# Patient Record
Sex: FEMALE | Race: WHITE | Employment: UNEMPLOYED | URBAN - METROPOLITAN AREA
[De-identification: names, ages, dates, MRNs, and addresses within clinical notes are randomized per-mention and may not be internally consistent; named-entity substitution may affect disease eponyms.]

---

## 2022-04-07 ENCOUNTER — OFFICE VISIT (OUTPATIENT)
Dept: OBGYN | Age: 27
End: 2022-04-07
Payer: COMMERCIAL

## 2022-04-07 VITALS
HEART RATE: 70 BPM | SYSTOLIC BLOOD PRESSURE: 116 MMHG | DIASTOLIC BLOOD PRESSURE: 61 MMHG | BODY MASS INDEX: 24.63 KG/M2 | WEIGHT: 162 LBS

## 2022-04-07 DIAGNOSIS — N93.9 ABNORMAL UTERINE BLEEDING (AUB): ICD-10-CM

## 2022-04-07 DIAGNOSIS — R10.2 PELVIC PAIN: Primary | ICD-10-CM

## 2022-04-07 LAB
HCT VFR BLD CALC: 42.3 % (ref 34–48)
HEMOGLOBIN: 13.5 G/DL (ref 11.5–15.5)
MCH RBC QN AUTO: 30.3 PG (ref 26–35)
MCHC RBC AUTO-ENTMCNC: 31.9 % (ref 32–34.5)
MCV RBC AUTO: 95.1 FL (ref 80–99.9)
PDW BLD-RTO: 13.5 FL (ref 11.5–15)
PLATELET # BLD: 340 E9/L (ref 130–450)
PMV BLD AUTO: 10.2 FL (ref 7–12)
PROLACTIN: 17.22 NG/ML
RBC # BLD: 4.45 E12/L (ref 3.5–5.5)
WBC # BLD: 9.5 E9/L (ref 4.5–11.5)

## 2022-04-07 PROCEDURE — 36415 COLL VENOUS BLD VENIPUNCTURE: CPT | Performed by: OBSTETRICS & GYNECOLOGY

## 2022-04-07 PROCEDURE — G8427 DOCREV CUR MEDS BY ELIG CLIN: HCPCS | Performed by: OBSTETRICS & GYNECOLOGY

## 2022-04-07 PROCEDURE — 99204 OFFICE O/P NEW MOD 45 MIN: CPT | Performed by: OBSTETRICS & GYNECOLOGY

## 2022-04-07 PROCEDURE — 4004F PT TOBACCO SCREEN RCVD TLK: CPT | Performed by: OBSTETRICS & GYNECOLOGY

## 2022-04-07 PROCEDURE — G8420 CALC BMI NORM PARAMETERS: HCPCS | Performed by: OBSTETRICS & GYNECOLOGY

## 2022-04-07 PROCEDURE — 99203 OFFICE O/P NEW LOW 30 MIN: CPT | Performed by: OBSTETRICS & GYNECOLOGY

## 2022-04-07 RX ORDER — ACETAMINOPHEN AND CODEINE PHOSPHATE 120; 12 MG/5ML; MG/5ML
1 SOLUTION ORAL DAILY
Qty: 30 TABLET | Refills: 1 | Status: SHIPPED
Start: 2022-04-07 | End: 2022-06-08 | Stop reason: SDUPTHER

## 2022-04-07 RX ORDER — ACETAMINOPHEN AND CODEINE PHOSPHATE 120; 12 MG/5ML; MG/5ML
SOLUTION ORAL
COMMUNITY
Start: 2022-01-12 | End: 2022-04-07 | Stop reason: SDUPTHER

## 2022-04-07 RX ORDER — TOPIRAMATE 25 MG/1
TABLET ORAL
COMMUNITY
Start: 2022-03-25 | End: 2022-07-18 | Stop reason: ALTCHOICE

## 2022-04-07 NOTE — PROGRESS NOTES
Dr. Ginny Carias:    33 y.o. female  Smith Guo presents with complaint of chronic pelvic pain since age 13. She was placed on OCP (Ovcon) that helped for some time. In September her pain worsened and she began to have 2 painful periods a month. She also had pain outside of her cycles. She is not sexually active and has never been. She trialed pelvic floor therapy without relief. Her doctor tried to place an IUD however was unsuccessful due to cervical stenosis. In February she was switched to norethindrone, now she is bleeding 3 times a month. Previous work up includes ultrasound and blood work which patient states is normal. She did have a small simple cyst on left ovary. The pain is described as bilateral, non radiating, and constant. Pt denies relief with tylenol and ibuprofen. She admits occasional nausea. She denies vomiting, diarrhea, or constipation. No fevers/chills. Denies dysuria or hematuria. Denies vaginal discharge. She does admit to some painful Bowel movements. Past Medical History:   Past Medical History:   Diagnosis Date    Anorexia     Anxiety     Depression     Migraine     with aura                                             OB History    Para Term  AB Living   0 0 0 0 0 0   SAB IAB Ectopic Molar Multiple Live Births   0 0 0 0 0 0         Past Surgical History: No past surgical history on file. Allergies: Patient has no known allergies.      Medications:   Current Outpatient Medications   Medication Sig Dispense Refill    sertraline (ZOLOFT) 50 MG tablet take ONE AND ONE-HALF tablets by mouth daily      topiramate (TOPAMAX) 25 MG tablet       norethindrone (MICRONOR) 0.35 MG tablet Take 1 tablet by mouth daily 30 tablet 1    ondansetron (ZOFRAN) 4 MG tablet Take 1 tablet by mouth every 8 hours as needed for Nausea or Vomiting (Patient not taking: Reported on 2022) 12 tablet 0    dicyclomine (BENTYL) 10 MG capsule 1 cupule every 6-8 hours for abdominal cramps when necessary (Patient not taking: Reported on 4/7/2022) 15 capsule 0     No current facility-administered medications for this visit. Social History:   Social History     Tobacco Use    Smoking status: Never Smoker    Smokeless tobacco: Never Used   Substance Use Topics    Alcohol use: No        Family History:   No family history on file. REVIEW OF SYSTEMS:    Constitutional: negative  HEENT: negative  Breast: negative  Respiratory: negative  Cardiovascular: negative  Gastrointestinal: negative  Genitourinary: As per HPI  Integument: negative  Neurological: negative  Endocrine: negative          PHYSICAL EXAM  /61   Pulse 70   Wt 162 lb (73.5 kg)   LMP 04/04/2022   BMI 24.63 kg/m²   Patient's last menstrual period was 04/04/2022. General appearance: alert, cooperative and in no acute distress. Head: NCAT   Abdomen: soft, non-tender; bowel sounds normal; no masses,  no organomegaly  Psych: No acute distress, mood and affect full range  Neuro: Alert and oriented, no focal deficits     Pelvic Exam:   EXTERNAL GENITALIA: normal external genitalia, no external lesions  VAGINA: normal rugae, no discharge   CERVIX: normal appearing, no lesions, small amount of blood  UTERUS: uterus is normal size, shape, consistency and nontender   ADNEXA: normal adnexa in size, nontender and no masses. RECTUM: no hemorrhoids or rectal masses. ASSESSMENT :      Diagnosis Orders   1. Pelvic pain  US NON OB TRANSVAGINAL    norethindrone (MICRONOR) 0.35 MG tablet   2. Abnormal uterine bleeding (AUB)  CBC    Prolactin    US NON OB TRANSVAGINAL    norethindrone (MICRONOR) 0.35 MG tablet        PLAN:    Return in about 2 weeks (around 4/21/2022) for Review results. Discussed depo vs lupron vs orilissa for mgmt. Pt has concern of bone density loss with those medications. Could consider add back therapy with progesterone. Also will consider diagnostic laparoscopy.  Get records from previous gyn. F/u 2-4 weeks for results review and plan. Continue micronor in the mean time.    Orders Placed This Encounter   Medications    norethindrone (MICRONOR) 0.35 MG tablet     Sig: Take 1 tablet by mouth daily     Dispense:  30 tablet     Refill:  1       Orders Placed This Encounter   Procedures    US NON OB TRANSVAGINAL     Standing Status:   Future     Standing Expiration Date:   4/7/2023     Order Specific Question:   Reason for exam:     Answer:   YUJ, pelvic pain    CBC     Standing Status:   Future     Standing Expiration Date:   4/7/2023    Prolactin     Standing Status:   Future     Standing Expiration Date:   4/7/2023         Electronically signed by Rafy Branch DO on 4/7/22

## 2022-04-07 NOTE — PROGRESS NOTES
Patient is here today for new patient visit, patient complains of chronic pelvic pain. Patient signed a records of release to get records from Dr. Natalie Llanos. Blood work was ordered and obtained.

## 2022-04-29 ENCOUNTER — HOSPITAL ENCOUNTER (OUTPATIENT)
Dept: ULTRASOUND IMAGING | Age: 27
Discharge: HOME | End: 2022-04-29
Payer: COMMERCIAL

## 2022-04-29 DIAGNOSIS — R10.2 PELVIC PAIN: ICD-10-CM

## 2022-04-29 DIAGNOSIS — N93.9 ABNORMAL UTERINE BLEEDING (AUB): ICD-10-CM

## 2022-04-29 PROCEDURE — 76830 TRANSVAGINAL US NON-OB: CPT

## 2022-05-05 ENCOUNTER — OFFICE VISIT (OUTPATIENT)
Dept: OBGYN | Age: 27
End: 2022-05-05
Payer: COMMERCIAL

## 2022-05-05 VITALS — SYSTOLIC BLOOD PRESSURE: 111 MMHG | DIASTOLIC BLOOD PRESSURE: 70 MMHG | HEART RATE: 73 BPM

## 2022-05-05 DIAGNOSIS — R10.2 PELVIC PAIN: Primary | ICD-10-CM

## 2022-05-05 PROCEDURE — 1036F TOBACCO NON-USER: CPT | Performed by: OBSTETRICS & GYNECOLOGY

## 2022-05-05 PROCEDURE — G8427 DOCREV CUR MEDS BY ELIG CLIN: HCPCS | Performed by: OBSTETRICS & GYNECOLOGY

## 2022-05-05 PROCEDURE — 99213 OFFICE O/P EST LOW 20 MIN: CPT | Performed by: OBSTETRICS & GYNECOLOGY

## 2022-05-05 PROCEDURE — 99212 OFFICE O/P EST SF 10 MIN: CPT | Performed by: OBSTETRICS & GYNECOLOGY

## 2022-05-05 PROCEDURE — G8420 CALC BMI NORM PARAMETERS: HCPCS | Performed by: OBSTETRICS & GYNECOLOGY

## 2022-05-05 NOTE — PROGRESS NOTES
HISTORY OF PRESENT ILLNESS:    Pt presents for follow up for chronic pelvic pain since age 13. She was placed on OCP (Ovcon) that helped for some time. In September her pain worsened and she began to have 2 painful periods a month. She also had pain outside of her cycles. She is not sexually active and has never been. She trialed pelvic floor therapy without relief. Her doctor tried to place an IUD however was unsuccessful due to cervical stenosis. In February she was switched to norethindrone, now she is bleeding 3 times a month. Previous work up includes ultrasound and blood work which patient states is normal. She did have a small simple cyst on left ovary.      The pain is described as bilateral, non radiating, and constant. Pt denies relief with tylenol and ibuprofen. She admits occasional nausea. She denies vomiting, diarrhea, or constipation. No fevers/chills. Denies dysuria or hematuria. Denies vaginal discharge. She does admit to some painful Bowel movements.      Records from previous gyn not available for review. US wnl. CBC and prolactin wnl. TSH per pt normal. Dec 2021 normal pap. Past Medical History:   Past Medical History:   Diagnosis Date    Anorexia     Anxiety     Depression     Migraine     with aura                                             OB History    Para Term  AB Living   0 0 0 0 0 0   SAB IAB Ectopic Molar Multiple Live Births   0 0 0 0 0 0         Past Surgical History: No past surgical history on file. Allergies: Patient has no known allergies.      Medications:   Current Outpatient Medications   Medication Sig Dispense Refill    sertraline (ZOLOFT) 50 MG tablet take ONE AND ONE-HALF tablets by mouth daily      topiramate (TOPAMAX) 25 MG tablet       norethindrone (MICRONOR) 0.35 MG tablet Take 1 tablet by mouth daily 30 tablet 1    ondansetron (ZOFRAN) 4 MG tablet Take 1 tablet by mouth every 8 hours as needed for Nausea or Vomiting (Patient not taking: Reported on 4/7/2022) 12 tablet 0    dicyclomine (BENTYL) 10 MG capsule 1 cupule every 6-8 hours for abdominal cramps when necessary (Patient not taking: Reported on 4/7/2022) 15 capsule 0     No current facility-administered medications for this visit. Social History:   Social History     Tobacco Use    Smoking status: Never Smoker    Smokeless tobacco: Never Used   Substance Use Topics    Alcohol use: No        Family History:   No family history on file. REVIEW OF SYSTEMS:    Constitutional: negative  HEENT: negative  Breast: negative  Respiratory: negative  Cardiovascular: negative  Gastrointestinal: negative  Genitourinary:negative  Integument: negative  Neurological: negative  Endocrine: negative          PHYSICAL EXAM  /70 (Site: Left Upper Arm, Position: Sitting)   Pulse 73   LMP 04/23/2022 (Exact Date)   Patient's last menstrual period was 04/23/2022 (exact date). General appearance: alert, cooperative and in no acute distress. Head: NCAT   Psych: No acute distress, mood and affect full range  Neuro: Alert and oriented, no focal deficits          ASSESSMENT :   Diagnosis Orders   1. Pelvic pain          PLAN:  Discussed options of orilissa vs depo vs lupron vs surgical. Pt opts for diagnostic lap. Return for preop. No orders of the defined types were placed in this encounter. No orders of the defined types were placed in this encounter.         Electronically signed by Lorena Soria DO on 5/5/22

## 2022-05-05 NOTE — PROGRESS NOTES
Patient alert and pleasant with no complaints  Here today for ultrasound results. Discharge instructions have been discussed with the patient. Patient advised to call our office with any questions or concerns. Voiced understanding.

## 2022-05-16 ENCOUNTER — TELEPHONE (OUTPATIENT)
Dept: OBGYN | Age: 27
End: 2022-05-16

## 2022-05-16 NOTE — TELEPHONE ENCOUNTER
Surgery scheduled 6/1/22 @ 1:00 pm  [address]  Pre-op appointment 5/25/22  Patient made aware of surgery date and time.   Patient voiced understanding   Diagnostic laparoscopy with possible fulguration of endometriosis with possible lysis of adhesions

## 2022-05-17 NOTE — TELEPHONE ENCOUNTER
Dr. Saurabh Morrison will be [facility] on June 1st. Spoke with patient and surgery scheduling, surgery moved to same time on June 15th.

## 2022-06-08 ENCOUNTER — OFFICE VISIT (OUTPATIENT)
Dept: OBGYN | Age: 27
End: 2022-06-08
Payer: COMMERCIAL

## 2022-06-08 VITALS
SYSTOLIC BLOOD PRESSURE: 112 MMHG | WEIGHT: 170.6 LBS | DIASTOLIC BLOOD PRESSURE: 59 MMHG | HEART RATE: 63 BPM | BODY MASS INDEX: 25.94 KG/M2

## 2022-06-08 DIAGNOSIS — N93.9 ABNORMAL UTERINE BLEEDING (AUB): ICD-10-CM

## 2022-06-08 DIAGNOSIS — R10.2 PELVIC PAIN: ICD-10-CM

## 2022-06-08 DIAGNOSIS — N94.6 DYSMENORRHEA: ICD-10-CM

## 2022-06-08 DIAGNOSIS — R10.2 CHRONIC PELVIC PAIN IN FEMALE: Primary | ICD-10-CM

## 2022-06-08 DIAGNOSIS — G89.29 CHRONIC PELVIC PAIN IN FEMALE: Primary | ICD-10-CM

## 2022-06-08 PROCEDURE — G8427 DOCREV CUR MEDS BY ELIG CLIN: HCPCS | Performed by: OBSTETRICS & GYNECOLOGY

## 2022-06-08 PROCEDURE — 99213 OFFICE O/P EST LOW 20 MIN: CPT | Performed by: OBSTETRICS & GYNECOLOGY

## 2022-06-08 PROCEDURE — 99214 OFFICE O/P EST MOD 30 MIN: CPT | Performed by: OBSTETRICS & GYNECOLOGY

## 2022-06-08 PROCEDURE — 1036F TOBACCO NON-USER: CPT | Performed by: OBSTETRICS & GYNECOLOGY

## 2022-06-08 PROCEDURE — G8419 CALC BMI OUT NRM PARAM NOF/U: HCPCS | Performed by: OBSTETRICS & GYNECOLOGY

## 2022-06-08 RX ORDER — CETIRIZINE HYDROCHLORIDE 10 MG/1
10 TABLET ORAL EVERY MORNING
COMMUNITY

## 2022-06-08 RX ORDER — ACETAMINOPHEN AND CODEINE PHOSPHATE 120; 12 MG/5ML; MG/5ML
1 SOLUTION ORAL DAILY
Qty: 30 TABLET | Refills: 3 | Status: ON HOLD
Start: 2022-06-08 | End: 2022-07-20 | Stop reason: HOSPADM

## 2022-06-08 NOTE — PROGRESS NOTES
Occupational History    Not on file   Tobacco Use    Smoking status: Never Smoker    Smokeless tobacco: Never Used   Substance and Sexual Activity    Alcohol use: No    Drug use: No    Sexual activity: Not on file   Other Topics Concern    Not on file   Social History Narrative    Not on file     Social Determinants of Health     Financial Resource Strain:     Difficulty of Paying Living Expenses: Not on file   Food Insecurity:     Worried About Running Out of Food in the Last Year: Not on file    Abhishek of Food in the Last Year: Not on file   Transportation Needs:     Lack of Transportation (Medical): Not on file    Lack of Transportation (Non-Medical): Not on file   Physical Activity:     Days of Exercise per Week: Not on file    Minutes of Exercise per Session: Not on file   Stress:     Feeling of Stress : Not on file   Social Connections:     Frequency of Communication with Friends and Family: Not on file    Frequency of Social Gatherings with Friends and Family: Not on file    Attends [employer] Services: Not on file    Active Member of [address] [place] [address] or Organizations: Not on file    Attends Club or Organization Meetings: Not on file    Marital Status: Not on file   Intimate Partner Violence:     Fear of Current or Ex-Partner: Not on file    Emotionally Abused: Not on file    Physically Abused: Not on file    Sexually Abused: Not on file   Housing Stability:     Unable to Pay for Housing in the Last Year: Not on file    Number of [place] in the Last Year: Not on file    Unstable Housing in the Last Year: Not on file       Family History:   No family history on file. REVIEW OF SYSTEMS:      Constitutional:  negative  Gastrointestinal:  negative  Genitourinary:  positive for dysmenorrhea  Integument/breast:  negative  Hematologic/lymphatic:  negative  Endocrine:  negative  Behavior/Psych:  negative    PHYSICAL EXAM:    Vitals: There were no vitals taken for this visit.     Gen A/Ox3  Heart RRR  Lungs Clear  Abd Soft, NT, ND, +BS  Ext No edema   Will examine in OR        ASSESSMENT AND PLAN:    34 yo with chronic pelvic pain and dysmenorrhea requesting surgical intervention. To OR for diagnostic laparoscopy, possible lysis of adhesions, possible fulguration of endometriosis, placement of IUD. R/B/A explained and informed consent obtained.         Rafy Branch DO 6/8/2022 8:58 AM

## 2022-06-08 NOTE — PROGRESS NOTES
Here today for pre op appointment with Dr. Dwaine Meade. Pre op  instructions have been discussed with the patient. Patient advised to call our office with any questions or concerns. Voiced understanding. Patient was admitted with worsening low back pain and radiculopathy

## 2022-07-18 NOTE — PROGRESS NOTES
[facility] PRE-ADMISSION TESTING INSTRUCTIONS    The Preadmission Testing patient is instructed accordingly using the following criteria (check applicable):    ARRIVAL INSTRUCTIONS:  [x] Parking the day of Surgery is located in the Main Entrance lot. Upon entering the door, make an immediate right to the surgery reception desk    [x] Bring photo ID and insurance card    [x] Bring in a copy of Living will or Durable Power of  papers. [x] Please be sure to arrange for responsible adult to provide transportation to and from the hospital    [x] Please arrange for responsible adult to be with you for the 24 hour period post procedure due to having anesthesia    [] If you awake am of surgery not feeling well or have temperature >100 please call [phone]    GENERAL INSTRUCTIONS:    [x] Nothing by mouth after midnight, including gum, candy, mints or water    [x] You may brush your teeth, but do not swallow any water    [] Take medications as instructed with 1-2 oz of water    [] Stop herbal supplements and vitamins 5 days prior to procedure    [] Follow preop dosing of blood thinners per physician instructions    [] Take 1/2 dose of evening insulin, but no insulin after midnight    [] No oral diabetic medications after midnight    [] If diabetic and have low blood sugar or feel symptomatic, take 1-2oz apple juice only    [x] Bring inhalers day of surgery    [] Bring C-PAP/ Bi-Pap day of surgery    [x] Bring urine specimen day of surgery    [x] Shower or bath with soap, lather and rinse well, AM of Surgery, no lotion, powders or creams to surgical site    [] Follow bowel prep as instructed per surgeon    [x] No tobacco products within 24 hours of surgery     [x] No alcohol or illegal drug use within 24 hours of surgery.     [x] Jewelry, body piercing's, eyeglasses, contact lenses and dentures are not permitted into surgery (bring cases)      [x] Please do not wear any nail polish, make up or hair products on the day of surgery    [x] You can expect a call the business day prior to procedure to notify you if your arrival time changes    [x] If you receive a survey after surgery we would greatly appreciate your comments    [] Parent/guardian of a minor must accompany their child and remain on the premises  the entire time they are under our care     [] Pediatric patients may bring favorite toy, blanket or comfort item with them    [] A caregiver or family member must remain with the patient during their stay if they are mentally handicapped, have dementia, disoriented or unable to use a call light or would be a safety concern if left unattended    [x] Please notify surgeon if you develop any illness between now and time of surgery (cold, cough, sore throat, fever, nausea, vomiting) or any signs of infections  including skin, wounds, and dental.    [x]  The Outpatient Pharmacy is available to fill your prescription here on your day of surgery, ask your preop nurse for details    [] Other instructions    EDUCATIONAL MATERIALS PROVIDED:    [] PAT Preoperative Education Packet/Booklet     [] Medication List    [] Transfusion bracelet applied with instructions    [] Shower with soap, lather and rinse well, and use CHG wipes provided the evening before surgery as instructed    [] Incentive spirometer with instructions

## 2022-07-20 ENCOUNTER — ANESTHESIA EVENT (OUTPATIENT)
Dept: OPERATING ROOM | Age: 27
End: 2022-07-20
Payer: COMMERCIAL

## 2022-07-20 ENCOUNTER — ANESTHESIA (OUTPATIENT)
Dept: OPERATING ROOM | Age: 27
End: 2022-07-20
Payer: COMMERCIAL

## 2022-07-20 ENCOUNTER — HOSPITAL ENCOUNTER (OUTPATIENT)
Age: 27
Setting detail: OUTPATIENT SURGERY
Discharge: HOME | End: 2022-07-20
Attending: OBSTETRICS & GYNECOLOGY | Admitting: OBSTETRICS & GYNECOLOGY
Payer: COMMERCIAL

## 2022-07-20 VITALS
HEART RATE: 86 BPM | RESPIRATION RATE: 16 BRPM | OXYGEN SATURATION: 97 % | BODY MASS INDEX: 25.01 KG/M2 | HEIGHT: 68 IN | TEMPERATURE: 97.2 F | DIASTOLIC BLOOD PRESSURE: 60 MMHG | SYSTOLIC BLOOD PRESSURE: 116 MMHG | WEIGHT: 165 LBS

## 2022-07-20 DIAGNOSIS — G89.18 POSTOPERATIVE PAIN: Primary | ICD-10-CM

## 2022-07-20 LAB
ABO/RH: NORMAL
ANTIBODY SCREEN: NORMAL
HCG, URINE, POC: NEGATIVE
HCT VFR BLD CALC: 38.8 % (ref 34–48)
HEMOGLOBIN: 13 G/DL (ref 11.5–15.5)
Lab: NORMAL
NEGATIVE QC PASS/FAIL: NORMAL
POSITIVE QC PASS/FAIL: NORMAL

## 2022-07-20 PROCEDURE — 6370000000 HC RX 637 (ALT 250 FOR IP): Performed by: ANESTHESIOLOGY

## 2022-07-20 PROCEDURE — 2580000003 HC RX 258: Performed by: NURSE ANESTHETIST, CERTIFIED REGISTERED

## 2022-07-20 PROCEDURE — 85018 HEMOGLOBIN: CPT

## 2022-07-20 PROCEDURE — 86850 RBC ANTIBODY SCREEN: CPT

## 2022-07-20 PROCEDURE — 58300 INSERT INTRAUTERINE DEVICE: CPT | Performed by: OBSTETRICS & GYNECOLOGY

## 2022-07-20 PROCEDURE — 86901 BLOOD TYPING SEROLOGIC RH(D): CPT

## 2022-07-20 PROCEDURE — 7100000001 HC PACU RECOVERY - ADDTL 15 MIN: Performed by: OBSTETRICS & GYNECOLOGY

## 2022-07-20 PROCEDURE — 3700000000 HC ANESTHESIA ATTENDED CARE: Performed by: OBSTETRICS & GYNECOLOGY

## 2022-07-20 PROCEDURE — 36415 COLL VENOUS BLD VENIPUNCTURE: CPT

## 2022-07-20 PROCEDURE — 2500000003 HC RX 250 WO HCPCS: Performed by: NURSE ANESTHETIST, CERTIFIED REGISTERED

## 2022-07-20 PROCEDURE — 3700000001 HC ADD 15 MINUTES (ANESTHESIA): Performed by: OBSTETRICS & GYNECOLOGY

## 2022-07-20 PROCEDURE — 86900 BLOOD TYPING SEROLOGIC ABO: CPT

## 2022-07-20 PROCEDURE — 3600000013 HC SURGERY LEVEL 3 ADDTL 15MIN: Performed by: OBSTETRICS & GYNECOLOGY

## 2022-07-20 PROCEDURE — 6360000002 HC RX W HCPCS: Performed by: OBSTETRICS & GYNECOLOGY

## 2022-07-20 PROCEDURE — 7100000010 HC PHASE II RECOVERY - FIRST 15 MIN: Performed by: OBSTETRICS & GYNECOLOGY

## 2022-07-20 PROCEDURE — 7100000000 HC PACU RECOVERY - FIRST 15 MIN: Performed by: OBSTETRICS & GYNECOLOGY

## 2022-07-20 PROCEDURE — 2709999900 HC NON-CHARGEABLE SUPPLY: Performed by: OBSTETRICS & GYNECOLOGY

## 2022-07-20 PROCEDURE — 2580000003 HC RX 258: Performed by: OBSTETRICS & GYNECOLOGY

## 2022-07-20 PROCEDURE — 58662 LAPAROSCOPY EXCISE LESIONS: CPT | Performed by: OBSTETRICS & GYNECOLOGY

## 2022-07-20 PROCEDURE — 7100000011 HC PHASE II RECOVERY - ADDTL 15 MIN: Performed by: OBSTETRICS & GYNECOLOGY

## 2022-07-20 PROCEDURE — 3600000003 HC SURGERY LEVEL 3 BASE: Performed by: OBSTETRICS & GYNECOLOGY

## 2022-07-20 PROCEDURE — 85014 HEMATOCRIT: CPT

## 2022-07-20 PROCEDURE — 6360000002 HC RX W HCPCS: Performed by: NURSE ANESTHETIST, CERTIFIED REGISTERED

## 2022-07-20 RX ORDER — SODIUM CHLORIDE 0.9 % (FLUSH) 0.9 %
5-40 SYRINGE (ML) INJECTION PRN
Status: DISCONTINUED | OUTPATIENT
Start: 2022-07-20 | End: 2022-07-20 | Stop reason: HOSPADM

## 2022-07-20 RX ORDER — NEOSTIGMINE METHYLSULFATE 1 MG/ML
INJECTION, SOLUTION INTRAVENOUS PRN
Status: DISCONTINUED | OUTPATIENT
Start: 2022-07-20 | End: 2022-07-20 | Stop reason: SDUPTHER

## 2022-07-20 RX ORDER — SODIUM CHLORIDE 9 MG/ML
INJECTION, SOLUTION INTRAVENOUS PRN
Status: DISCONTINUED | OUTPATIENT
Start: 2022-07-20 | End: 2022-07-20 | Stop reason: HOSPADM

## 2022-07-20 RX ORDER — KETOROLAC TROMETHAMINE 30 MG/ML
INJECTION, SOLUTION INTRAMUSCULAR; INTRAVENOUS PRN
Status: DISCONTINUED | OUTPATIENT
Start: 2022-07-20 | End: 2022-07-20 | Stop reason: SDUPTHER

## 2022-07-20 RX ORDER — SODIUM CHLORIDE, SODIUM LACTATE, POTASSIUM CHLORIDE, CALCIUM CHLORIDE 600; 310; 30; 20 MG/100ML; MG/100ML; MG/100ML; MG/100ML
INJECTION, SOLUTION INTRAVENOUS CONTINUOUS PRN
Status: DISCONTINUED | OUTPATIENT
Start: 2022-07-20 | End: 2022-07-20 | Stop reason: SDUPTHER

## 2022-07-20 RX ORDER — ONDANSETRON 2 MG/ML
INJECTION INTRAMUSCULAR; INTRAVENOUS PRN
Status: DISCONTINUED | OUTPATIENT
Start: 2022-07-20 | End: 2022-07-20 | Stop reason: SDUPTHER

## 2022-07-20 RX ORDER — IBUPROFEN 800 MG/1
800 TABLET ORAL EVERY 6 HOURS PRN
Qty: 30 TABLET | Refills: 0 | Status: SHIPPED | OUTPATIENT
Start: 2022-07-20

## 2022-07-20 RX ORDER — FENTANYL CITRATE 50 UG/ML
INJECTION, SOLUTION INTRAMUSCULAR; INTRAVENOUS PRN
Status: DISCONTINUED | OUTPATIENT
Start: 2022-07-20 | End: 2022-07-20 | Stop reason: SDUPTHER

## 2022-07-20 RX ORDER — EPHEDRINE SULFATE/0.9% NACL/PF 50 MG/5 ML
SYRINGE (ML) INTRAVENOUS PRN
Status: DISCONTINUED | OUTPATIENT
Start: 2022-07-20 | End: 2022-07-20 | Stop reason: SDUPTHER

## 2022-07-20 RX ORDER — GLYCOPYRROLATE 0.2 MG/ML
INJECTION INTRAMUSCULAR; INTRAVENOUS PRN
Status: DISCONTINUED | OUTPATIENT
Start: 2022-07-20 | End: 2022-07-20 | Stop reason: SDUPTHER

## 2022-07-20 RX ORDER — SODIUM CHLORIDE 0.9 % (FLUSH) 0.9 %
5-40 SYRINGE (ML) INJECTION EVERY 12 HOURS SCHEDULED
Status: DISCONTINUED | OUTPATIENT
Start: 2022-07-20 | End: 2022-07-20 | Stop reason: HOSPADM

## 2022-07-20 RX ORDER — MIDAZOLAM HYDROCHLORIDE 1 MG/ML
INJECTION INTRAMUSCULAR; INTRAVENOUS PRN
Status: DISCONTINUED | OUTPATIENT
Start: 2022-07-20 | End: 2022-07-20 | Stop reason: SDUPTHER

## 2022-07-20 RX ORDER — HYDROCODONE BITARTRATE AND ACETAMINOPHEN 5; 325 MG/1; MG/1
1 TABLET ORAL EVERY 4 HOURS PRN
Qty: 20 TABLET | Refills: 0 | Status: SHIPPED | OUTPATIENT
Start: 2022-07-20 | End: 2022-07-23

## 2022-07-20 RX ORDER — LIDOCAINE HYDROCHLORIDE 20 MG/ML
INJECTION, SOLUTION EPIDURAL; INFILTRATION; INTRACAUDAL; PERINEURAL PRN
Status: DISCONTINUED | OUTPATIENT
Start: 2022-07-20 | End: 2022-07-20 | Stop reason: SDUPTHER

## 2022-07-20 RX ORDER — PROPOFOL 10 MG/ML
INJECTION, EMULSION INTRAVENOUS PRN
Status: DISCONTINUED | OUTPATIENT
Start: 2022-07-20 | End: 2022-07-20 | Stop reason: SDUPTHER

## 2022-07-20 RX ORDER — ROCURONIUM BROMIDE 10 MG/ML
INJECTION, SOLUTION INTRAVENOUS PRN
Status: DISCONTINUED | OUTPATIENT
Start: 2022-07-20 | End: 2022-07-20 | Stop reason: SDUPTHER

## 2022-07-20 RX ORDER — DEXAMETHASONE SODIUM PHOSPHATE 4 MG/ML
INJECTION, SOLUTION INTRA-ARTICULAR; INTRALESIONAL; INTRAMUSCULAR; INTRAVENOUS; SOFT TISSUE PRN
Status: DISCONTINUED | OUTPATIENT
Start: 2022-07-20 | End: 2022-07-20 | Stop reason: SDUPTHER

## 2022-07-20 RX ORDER — PROCHLORPERAZINE EDISYLATE 5 MG/ML
5 INJECTION INTRAMUSCULAR; INTRAVENOUS
Status: DISCONTINUED | OUTPATIENT
Start: 2022-07-20 | End: 2022-07-20 | Stop reason: HOSPADM

## 2022-07-20 RX ORDER — HYDROCODONE BITARTRATE AND ACETAMINOPHEN 5; 325 MG/1; MG/1
1 TABLET ORAL ONCE
Status: COMPLETED | OUTPATIENT
Start: 2022-07-20 | End: 2022-07-20

## 2022-07-20 RX ADMIN — ROCURONIUM BROMIDE 40 MG: 10 INJECTION, SOLUTION INTRAVENOUS at 12:33

## 2022-07-20 RX ADMIN — KETOROLAC TROMETHAMINE 30 MG: 30 INJECTION, SOLUTION INTRAMUSCULAR; INTRAVENOUS at 13:41

## 2022-07-20 RX ADMIN — PROPOFOL 150 MG: 10 INJECTION, EMULSION INTRAVENOUS at 12:33

## 2022-07-20 RX ADMIN — Medication 5 MG: at 13:03

## 2022-07-20 RX ADMIN — FENTANYL CITRATE 50 MCG: 50 INJECTION, SOLUTION INTRAMUSCULAR; INTRAVENOUS at 13:41

## 2022-07-20 RX ADMIN — FENTANYL CITRATE 50 MCG: 50 INJECTION, SOLUTION INTRAMUSCULAR; INTRAVENOUS at 12:30

## 2022-07-20 RX ADMIN — ONDANSETRON 4 MG: 2 INJECTION INTRAMUSCULAR; INTRAVENOUS at 13:21

## 2022-07-20 RX ADMIN — LEVONORGESTREL 1 EACH: 52 INTRAUTERINE DEVICE INTRAUTERINE at 13:19

## 2022-07-20 RX ADMIN — Medication 3 MG: at 13:28

## 2022-07-20 RX ADMIN — Medication 10 MG: at 13:00

## 2022-07-20 RX ADMIN — FENTANYL CITRATE 50 MCG: 50 INJECTION, SOLUTION INTRAMUSCULAR; INTRAVENOUS at 12:51

## 2022-07-20 RX ADMIN — GLYCOPYRROLATE 0.6 MG: 0.2 INJECTION INTRAMUSCULAR; INTRAVENOUS at 13:28

## 2022-07-20 RX ADMIN — MIDAZOLAM 2 MG: 1 INJECTION INTRAMUSCULAR; INTRAVENOUS at 12:23

## 2022-07-20 RX ADMIN — LIDOCAINE HYDROCHLORIDE 100 MG: 20 INJECTION, SOLUTION EPIDURAL; INFILTRATION; INTRACAUDAL; PERINEURAL at 12:33

## 2022-07-20 RX ADMIN — SODIUM CHLORIDE: 9 INJECTION, SOLUTION INTRAVENOUS at 12:15

## 2022-07-20 RX ADMIN — DEXAMETHASONE SODIUM PHOSPHATE 8 MG: 4 INJECTION, SOLUTION INTRAMUSCULAR; INTRAVENOUS at 12:42

## 2022-07-20 RX ADMIN — SODIUM CHLORIDE, POTASSIUM CHLORIDE, SODIUM LACTATE AND CALCIUM CHLORIDE: 600; 310; 30; 20 INJECTION, SOLUTION INTRAVENOUS at 13:03

## 2022-07-20 RX ADMIN — HYDROCODONE BITARTRATE AND ACETAMINOPHEN 1 TABLET: 5; 325 TABLET ORAL at 15:00

## 2022-07-20 RX ADMIN — GLYCOPYRROLATE 0.2 MG: 0.2 INJECTION INTRAMUSCULAR; INTRAVENOUS at 12:53

## 2022-07-20 RX ADMIN — FENTANYL CITRATE 50 MCG: 50 INJECTION, SOLUTION INTRAMUSCULAR; INTRAVENOUS at 12:33

## 2022-07-20 ASSESSMENT — PAIN DESCRIPTION - FREQUENCY
FREQUENCY: INTERMITTENT
FREQUENCY: CONTINUOUS
FREQUENCY: CONTINUOUS

## 2022-07-20 ASSESSMENT — PAIN DESCRIPTION - ORIENTATION
ORIENTATION: LOWER
ORIENTATION: LOWER;MID
ORIENTATION: LOWER

## 2022-07-20 ASSESSMENT — PAIN DESCRIPTION - PAIN TYPE
TYPE: SURGICAL PAIN
TYPE: ACUTE PAIN;SURGICAL PAIN
TYPE: SURGICAL PAIN

## 2022-07-20 ASSESSMENT — PAIN DESCRIPTION - LOCATION
LOCATION: ABDOMEN

## 2022-07-20 ASSESSMENT — PAIN SCALES - GENERAL
PAINLEVEL_OUTOF10: 6
PAINLEVEL_OUTOF10: 2

## 2022-07-20 ASSESSMENT — PAIN DESCRIPTION - DESCRIPTORS
DESCRIPTORS: CRAMPING

## 2022-07-20 NOTE — ANESTHESIA PRE PROCEDURE
Department of Anesthesiology  Preprocedure Note       Name:  Mary Kay Moore   Age:  33 y.o.  :  1995                                          MRN:  [id number]         Date:  2022      Surgeon: Milady Ashley):  Amy Wilson DO    Procedure: Procedure(s):  DIAGNOSTIC LAPAROSCOPY POSSIBLE FULGURATION OF ENDOMETRIOSIS POSSIBLE LYSIS OF ADHESIONS PLACEMENT OF MIRENA    Medications prior to admission:   Prior to Admission medications    Medication Sig Start Date End Date Taking? Authorizing Provider   ALBUTEROL SULFATE HFA IN Inhale 1 puff into the lungs daily as needed Only prior to exercising   Yes Historical Provider, MD   cetirizine (ZYRTEC) 10 MG tablet Take 10 mg by mouth every morning    Historical Provider, MD   norethindrone (MICRONOR) 0.35 MG tablet Take 1 tablet by mouth daily 22   Amy Wilson DO   sertraline (ZOLOFT) 50 MG tablet Take 75 mg by mouth at bedtime 22   Historical Provider, MD       Current medications:    Current Facility-Administered Medications   Medication Dose Route Frequency Provider Last Rate Last Admin    sodium chloride flush 0.9 % injection 5-40 mL  5-40 mL IntraVENous 2 times per day Amy Wilson DO        sodium chloride flush 0.9 % injection 5-40 mL  5-40 mL IntraVENous PRN Amy Wilson DO        0.9 % sodium chloride infusion   IntraVENous PRN Amy Wilson DO           Allergies:  No Known Allergies    Problem List:  There is no problem list on file for this patient.       Past Medical History:        Diagnosis Date    Anesthesia complication     pt states \"I have woke up during procedures\"    Anorexia     Anxiety     Chronic pelvic pain in female     Depression     Migraine     with aura       Past Surgical History:        Procedure Laterality Date    COLONOSCOPY      ESOPHAGOGASTRODUODENOSCOPY      WISDOM TOOTH EXTRACTION         Social History:    Social History     Tobacco Use    Smoking status: Never    Smokeless tobacco: Never Substance Use Topics    Alcohol use: Yes     Alcohol/week: 4.0 standard drinks     Types: 4 Glasses of wine per week                                Counseling given: Not Answered      Vital Signs (Current):   Vitals:    07/18/22 1145 07/20/22 1045   BP:  101/62   Pulse:  79   Resp:  18   SpO2:  96%   Weight: 165 lb (74.8 kg)    Height: 5' 8\" (1.727 m)                                               BP Readings from Last 3 Encounters:   07/20/22 101/62   06/08/22 (!) 112/59   05/05/22 111/70       NPO Status: Time of last liquid consumption: 2330                        Time of last solid consumption: 2100                        Date of last liquid consumption: 07/19/22                        Date of last solid food consumption: 07/19/22    BMI:   Wt Readings from Last 3 Encounters:   07/18/22 165 lb (74.8 kg)   06/08/22 170 lb 9.6 oz (77.4 kg)   04/07/22 162 lb (73.5 kg)     Body mass index is 25.09 kg/m². CBC:   Lab Results   Component Value Date/Time    WBC 9.5 04/07/2022 12:00 PM    RBC 4.45 04/07/2022 12:00 PM    HGB 13.0 07/20/2022 10:25 AM    HCT 38.8 07/20/2022 10:25 AM    MCV 95.1 04/07/2022 12:00 PM    RDW 13.5 04/07/2022 12:00 PM     04/07/2022 12:00 PM       CMP:   Lab Results   Component Value Date/Time     04/19/2017 08:53 PM    K 3.7 04/19/2017 08:53 PM    CL 98 04/19/2017 08:53 PM    CO2 22 04/19/2017 08:53 PM    BUN 12 04/19/2017 08:53 PM    CREATININE 0.6 04/19/2017 08:53 PM    LABGLOM >90 04/19/2017 08:53 PM    GLUCOSE 98 04/19/2017 08:53 PM    PROT 7.3 04/19/2017 08:53 PM    CALCIUM 8.9 04/19/2017 08:53 PM    BILITOT 0.3 04/19/2017 08:53 PM    ALKPHOS 59 04/19/2017 08:53 PM    AST 15 04/19/2017 08:53 PM    ALT 9 04/19/2017 08:53 PM       POC Tests: No results for input(s): POCGLU, POCNA, POCK, POCCL, POCBUN, POCHEMO, POCHCT in the last 72 hours.     Coags: No results found for: PROTIME, INR, APTT    HCG (If Applicable):   Lab Results   Component Value Date    PREGSERUM NEGATIVE 04/19/2017        ABGs: No results found for: PHART, PO2ART, OYT4YOP, HBM4ZWA, BEART, K4KZUJCV     Type & Screen (If Applicable):  No results found for: LABABO, LABRH    Drug/Infectious Status (If Applicable):  No results found for: HIV, HEPCAB    COVID-19 Screening (If Applicable): No results found for: COVID19        Anesthesia Evaluation  Patient summary reviewed and Nursing notes reviewed no history of anesthetic complications:   Airway: Mallampati: II  TM distance: >3 FB   Neck ROM: full  Mouth opening: > = 3 FB   Dental: normal exam         Pulmonary:Negative Pulmonary ROS and normal exam                               Cardiovascular:  Exercise tolerance: good (>4 METS),                     Neuro/Psych:   (+) headaches: migraine headaches, psychiatric history:            GI/Hepatic/Renal: Neg GI/Hepatic/Renal ROS            Endo/Other: Negative Endo/Other ROS                    Abdominal:             Vascular: negative vascular ROS. Other Findings:           Anesthesia Plan      general     ASA 1       Induction: intravenous. MIPS: Postoperative opioids intended and Prophylactic antiemetics administered. Anesthetic plan and risks discussed with patient and mother. Plan discussed with CRNA.                     Dilip Dominguez MD   7/20/2022    Note reviewed and agree with plan Rosalee Monk MD

## 2022-07-20 NOTE — H&P
Department of Gynecology   History and Physical                 HISTORY OF PRESENT ILLNESS:                     The patient is a 33 y.o. female  who presents with chronic pelvic pain since age 13. She was placed on OCP (Ovcon) that helped for some time. In September her pain worsened and she began to have 2 painful periods a month. She also had pain outside of her cycles. She is not sexually active and has never been. She trialed pelvic floor therapy without relief. Her doctor tried to place an IUD however was unsuccessful due to cervical stenosis. In February she was switched to norethindrone, now she is bleeding 3 times a month. Previous work up includes ultrasound and blood work which patient states is normal. She did have a small simple cyst on left ovary. The pain is described as bilateral, non radiating, and constant. Pt denies relief with tylenol and ibuprofen. She admits occasional nausea. She denies vomiting, diarrhea, or constipation. No fevers/chills. Denies dysuria or hematuria. Denies vaginal discharge. She does admit to some painful Bowel movements. Records from previous gyn not available for review. US wnl. CBC and prolactin wnl. TSH per pt normal. Dec 2021 normal pap. Pt would like IUD for cycle control and management of her symptoms. Past Medical History:    Past Medical History             Diagnosis Date    Anorexia      Anxiety      Depression      Migraine       with aura         Past Surgical History:    Past Surgical History   No past surgical history on file. OB History    Para Term  AB Living   0 0 0 0 0 0   SAB IAB Ectopic Molar Multiple Live Births    0 0 0 0 0 0         Medications Prior to Admission:   Prescriptions Prior to Admission   Not in a hospital admission. Allergies:  Patient has no known allergies.       Social History:  Social History   Social History            Socioeconomic History    Marital status: Single Spouse name: Not on file    Number of children: Not on file    Years of education: Not on file    Highest education level: Not on file   Occupational History    Not on file   Tobacco Use    Smoking status: Never Smoker    Smokeless tobacco: Never Used   Substance and Sexual Activity    Alcohol use: No    Drug use: No    Sexual activity: Not on file   Other Topics Concern    Not on file   Social History Narrative    Not on file      Social Determinants of Health          Financial Resource Strain:    Difficulty of Paying Living Expenses: Not on file   Food Insecurity:    Worried About [employer] in the Last Year: Not on file    Ran Out of Food in the Last Year: Not on file   Transportation Needs:    Lack of Transportation (Medical): Not on file    Lack of Transportation (Non-Medical): Not on file   Physical Activity:    Days of Exercise per Week: Not on file    Minutes of Exercise per Session: Not on file   Stress:    Feeling of Stress : Not on file   Social Connections:    Frequency of Communication with Friends and Family: Not on file    Frequency of Social Gatherings with Friends and Family: Not on file    Attends Cheondoism Services: Not on file    Active Member of Clubs or Organizations: Not on file    Attends Club or Organization Meetings: Not on file    Marital Status: Not on file   Intimate Partner Violence:    Fear of Current or Ex-Partner: Not on file    Emotionally Abused: Not on file    Physically Abused: Not on file    Sexually Abused: Not on file   Housing Stability:    Unable to Pay for Housing in the Last Year: Not on file    Number of [place] in the Last Year: Not on file    Unstable Housing in the Last Year: Not on file            Family History:   Family History   No family history on file.         REVIEW OF SYSTEMS:       Constitutional:  negative  Gastrointestinal:  negative  Genitourinary:  positive for dysmenorrhea  Integument/breast:  negative  Hematologic/lymphatic: negative  Endocrine:  negative  Behavior/Psych:  negative     PHYSICAL EXAM:     Vitals: There were no vitals taken for this visit. Gen A/Ox3  Heart RRR  Lungs Clear  Abd Soft, NT, ND, +BS  Ext No edema   Will examine in OR           ASSESSMENT AND PLAN:    34 yo with chronic pelvic pain and dysmenorrhea requesting surgical intervention. To OR for diagnostic laparoscopy, possible lysis of adhesions, possible fulguration of endometriosis, placement of IUD. R/B/A explained and informed consent obtained.            Blaine Pond DO 8:40 AM 07/20/22

## 2022-07-20 NOTE — OP NOTE
Operative Note      Patient: Lian Rubio  YOB: 1995  MRN: [id number]    Date of Procedure: 7/20/2022    Pre-Op Diagnosis: CHRONIC PELVIC PAIN DYSMENORRHEA    Post-Op Diagnosis:   1. Chronic pelvic pain  2. Dysmenorrhea   3. Stage II endometriosis   4. Bowel adhesions       Procedure(s):  DIAGNOSTIC LAPAROSCOPY, FULGURATION OF ENDOMETRIOSIS, LYSIS OF ADHESIONS, PLACEMENT OF MIRENA    Surgeon(s):  Jose Castillo DO    Assistant:   * No surgical staff found *    Anesthesia: General    Estimated Blood Loss (mL): Minimal    Complications: None    Specimens:   * No specimens in log *    Implants:  * No implants in log *      Drains:   [REMOVED] Urinary Catheter Fernandes (Removed)       Findings: The patient is a 73-year-old  female with normal age-specific female secondary sexual characteristics and escutcheon. Vaginal mucosa and cervix were of normal morphology. The uterus sounded to 8 cm. Laparoscopic examination revealed two endometrial implants, one at the left uterosacral ligament and the other at the left cul-de-sac. There were filmy bowel adhesions to the bilateral abdominal sidewalls. The bowels appeared distended and full of stool. There was a small appendage coming from the descending colon. The uterus and bilateral fallopian tubes and ovaries appeared normal.  The remainder of the abdomen and pelvis were without visible pathology. Detailed Description of Procedure: The patient was taken the operating room where a general anesthetic was administered. Upon adequate anesthesia, the patient was placed in dorsolithotomy position and sterilely prepped and draped in a manner appropriate for this procedure. Bimanual exam was performed. A weighted speculum was placed. The anterior lip of the cervix was grasped with a single-tooth tenaculum. The Q-Senseilka uterine manipulator was inserted into the uterine cavity in an anteverted fashion.   Gloves were changed and attention was turned to the abdomen for the laparoscopic portion of this procedure. A 5 mm vertical skin incision was made at the umbilicus. A 5 mm port was then placed under direct visualization. A pneumoperitoneum was created. A second 5 mm horizontal skin incision was made approximately two thirds of the way between the left ASIS and the umbilicus. A 5 mm port was then placed under direct visualization. A thorough laparoscopic examination then ensued with above-noted findings. The aforementioned endometrial implants were then fulgurated with cautery. Attention was then turned to the bowel adhesions. These were taken down with laparoscopic scissors and cautery. At this time all instruments were removed from the abdomen. Excellent hemostasis was noted. CO2 gas was allowed to percolate through the open cannula sites. The cannulas were removed. Skin incisions were closed with 4-0 undyed Vicryl and Dermabond skin glue. Attention was then turned to the pelvis where the [employer] uterine manipulator was removed. The anterior lip of the cervix was grasped with a single-tooth tenaculum. The cervix was then serially dilated with Cas metal dilators. The uterus sounded to 8 cm. The Mirena IUD was then inserted into the uterine cavity and the strings were trimmed to 1 inch. Excellent hemostasis was noted. At this time the procedure was complete. All instruments were removed from the vagina. All sponge needle and instrument counts were found to be correct x2. The patient tolerated the procedure well and was sent to recovery in stable condition.     Electronically signed by Ravi Jacques DO on 7/20/2022 at 1:44 PM

## 2022-07-20 NOTE — ANESTHESIA POSTPROCEDURE EVALUATION
Department of Anesthesiology  Postprocedure Note    Patient: Verenice Serrano  MRN: [id number]  YOB: 1995  Date of evaluation: 7/20/2022      Procedure Summary     Date: 07/20/22 Room / Location: [facility] OR 01 / [facility]    Anesthesia Start: 2927 Anesthesia Stop: 9512    Procedure: DIAGNOSTIC LAPAROSCOPY, FULGURATION OF ENDOMETRIOSIS, LYSIS OF ADHESIONS, PLACEMENT OF MIRENA (Abdomen) Diagnosis:       Chronic pain in female pelvis      Dysmenorrhea      (CHRONIC PELVIC PAIN DYSMENORRHEA)    Surgeons: Elizabeth Clemons DO Responsible Provider:     Anesthesia Type: general ASA Status: 1          Anesthesia Type: No value filed.     Ely Phase I: Ely Score: 10    Ely Phase II:        Anesthesia Post Evaluation    Patient location during evaluation: PACU  Patient participation: complete - patient participated  Level of consciousness: awake and alert  Pain score: 0  Airway patency: patent  Nausea & Vomiting: no nausea and no vomiting  Complications: no  Cardiovascular status: blood pressure returned to baseline  Respiratory status: acceptable  Hydration status: euvolemic  Multimodal analgesia pain management approach

## 2022-07-21 ENCOUNTER — TELEPHONE (OUTPATIENT)
Dept: ADMINISTRATIVE | Age: 27
End: 2022-07-21

## 2022-07-21 NOTE — TELEPHONE ENCOUNTER
Pt needs post procedure check up appointment in 1 week. No openings were available. Staff unavailable due to pt care. Please call pt back. Thanks!

## 2022-07-21 NOTE — PROGRESS NOTES
CLINICAL PHARMACY NOTE: MEDS TO BEDS    Total # of Prescriptions Filled: 2   The following medications were delivered to the patient:   mg  Norco 5-325 mg    Additional Documentation:

## 2022-07-27 ENCOUNTER — OFFICE VISIT (OUTPATIENT)
Dept: OBGYN | Age: 27
End: 2022-07-27
Payer: COMMERCIAL

## 2022-07-27 VITALS
WEIGHT: 166.4 LBS | DIASTOLIC BLOOD PRESSURE: 63 MMHG | HEART RATE: 70 BPM | BODY MASS INDEX: 25.3 KG/M2 | SYSTOLIC BLOOD PRESSURE: 102 MMHG

## 2022-07-27 DIAGNOSIS — Z98.890 POSTOPERATIVE STATE: Primary | ICD-10-CM

## 2022-07-27 PROCEDURE — 99213 OFFICE O/P EST LOW 20 MIN: CPT | Performed by: OBSTETRICS & GYNECOLOGY

## 2022-07-27 PROCEDURE — 99024 POSTOP FOLLOW-UP VISIT: CPT | Performed by: OBSTETRICS & GYNECOLOGY

## 2022-07-27 NOTE — PROGRESS NOTES
HISTORY OF PRESENT ILLNESS:    33 y.o. female  here for post op appointment. The patient has no complaints. Pain is controlled. Has some spotting with IUD. Tolerating regular diet. Denies gastrointestinal or urinary complaints. Diagnosis: Chronic pelvic pain, stage 2 endometriosis, dysmenorrhea, bowel adhesions    Surgery: Diagnostic laparoscopy with fulguration of endometriosis, ERIKA, Placement of Mirena    Findings: The patient is a 60-year-old  female with normal age-specific female secondary sexual characteristics and escutcheon. Vaginal mucosa and cervix were of normal morphology. The uterus sounded to 8 cm. Laparoscopic examination revealed two endometrial implants, one at the left uterosacral ligament and the other at the left cul-de-sac. There were filmy bowel adhesions to the bilateral abdominal sidewalls. The bowels appeared distended and full of stool. There was a small appendage coming from the descending colon. The uterus and bilateral fallopian tubes and ovaries appeared normal.  The remainder of the abdomen and pelvis were without visible pathology. Complications: None      Past Medical History:   Past Medical History:   Diagnosis Date    Anesthesia complication     pt states \"I have woke up during procedures\"    Anorexia     Anxiety     Chronic pelvic pain in female     Depression     Migraine     with aura                                             OB History    Para Term  AB Living   0 0 0 0 0 0   SAB IAB Ectopic Molar Multiple Live Births   0 0 0 0 0 0          Past Surgical History:   Past Surgical History:   Procedure Laterality Date    COLONOSCOPY      ESOPHAGOGASTRODUODENOSCOPY      LAPAROSCOPY N/A 2022    DIAGNOSTIC LAPAROSCOPY, FULGURATION OF ENDOMETRIOSIS, LYSIS OF ADHESIONS, PLACEMENT OF MIRENA performed by Blaine Pond DO at [facility]: Patient has no known allergies.      Medications:   Current Outpatient Medications   Medication Sig Dispense Refill    ibuprofen (ADVIL;MOTRIN) 800 MG tablet Take 1 tablet by mouth every 6 hours as needed for Pain 30 tablet 0    ALBUTEROL SULFATE HFA IN Inhale 1 puff into the lungs daily as needed Only prior to exercising      cetirizine (ZYRTEC) 10 MG tablet Take 10 mg by mouth every morning      sertraline (ZOLOFT) 50 MG tablet Take 75 mg by mouth at bedtime       No current facility-administered medications for this visit. Social History:   Social History     Tobacco Use    Smoking status: Never    Smokeless tobacco: Never   Substance Use Topics    Alcohol use: Yes     Alcohol/week: 4.0 standard drinks     Types: 4 Glasses of wine per week        Family History:   No family history on file. REVIEW OF SYSTEMS:    Constitutional: negative  HEENT: negative  Breast: negative  Respiratory: negative  Cardiovascular: negative  Gastrointestinal: negative  Genitourinary:negative  Integument: negative  Neurological: negative  Endocrine: negative          PHYSICAL EXAM  There were no vitals taken for this visit. General appearance: alert, cooperative and in no acute distress. Head: NCAT   Abdomen: soft, nontender, nondistended, no masses palpable, no rebound tenderness, no guarding or rigidity  Incision: C/D/I  Neurologic: Alert and oriented, no focal deficits  Psych: Alert, oriented, mood/affect full range, no acute distress          ASSESSMENT :    Diagnosis Orders   1. Postoperative state             PLAN:    Postop instructions given. Reviewed findings of surgery. All questions answered. Recommend f/u with GI and/or general surgery. F/u 3 months for IUD check. Return in about 3 months (around 10/27/2022) for IUD check. No orders of the defined types were placed in this encounter. No orders of the defined types were placed in this encounter.         Electronically signed by Ana Parkinson DO on 7/27/22

## 2022-07-27 NOTE — PROGRESS NOTES
Here today for f/u from laparoscopy 7/20/22 and IUD placement. D/c instructions given per physician and patient verbalized understanding.

## 2022-09-08 ENCOUNTER — HOSPITAL ENCOUNTER (OUTPATIENT)
Dept: HOSPITAL 83 - LAB | Age: 27
Discharge: HOME | End: 2022-09-08
Attending: INTERNAL MEDICINE
Payer: COMMERCIAL

## 2022-09-08 DIAGNOSIS — N80.9: Primary | ICD-10-CM

## 2022-09-08 LAB
ALP SERPL-CCNC: 68 U/L (ref 45–117)
ALT SERPL W P-5'-P-CCNC: 20 U/L (ref 12–78)
AST SERPL-CCNC: 10 IU/L (ref 3–35)
BASOPHILS # BLD AUTO: 0 10*3/UL (ref 0–0.1)
BASOPHILS NFR BLD AUTO: 0.7 % (ref 0–1)
BUN SERPL-MCNC: 12 MG/DL (ref 7–24)
CHLORIDE SERPL-SCNC: 111 MMOL/L (ref 98–107)
CREAT SERPL-MCNC: 0.65 MG/DL (ref 0.55–1.02)
EOSINOPHIL # BLD AUTO: 0.1 10*3/UL (ref 0–0.4)
EOSINOPHIL # BLD AUTO: 1.5 % (ref 1–4)
ERYTHROCYTE [DISTWIDTH] IN BLOOD BY AUTOMATED COUNT: 12.6 % (ref 0–14.5)
HCT VFR BLD AUTO: 40.8 % (ref 37–47)
LYMPHOCYTES # BLD AUTO: 1.4 10*3/UL (ref 1.3–4.4)
LYMPHOCYTES NFR BLD AUTO: 24.6 % (ref 27–41)
MCH RBC QN AUTO: 30.4 PG (ref 27–31)
MCHC RBC AUTO-ENTMCNC: 32.6 G/DL (ref 33–37)
MCV RBC AUTO: 93.4 FL (ref 81–99)
MONOCYTES # BLD AUTO: 0.5 10*3/UL (ref 0.1–1)
MONOCYTES NFR BLD MANUAL: 8.8 % (ref 3–9)
NEUT #: 3.7 10*3/UL (ref 2.3–7.9)
NEUT %: 63.9 % (ref 47–73)
NRBC BLD QL AUTO: 0 10*3/UL (ref 0–0)
PLATELET # BLD AUTO: 304 10*3/UL (ref 130–400)
PMV BLD AUTO: 9.8 FL (ref 9.6–12.3)
POTASSIUM SERPL-SCNC: 4.5 MMOL/L (ref 3.5–5.1)
PROT SERPL-MCNC: 7.5 GM/DL (ref 6.4–8.2)
RBC # BLD AUTO: 4.37 10*6/UL (ref 4.1–5.1)
SODIUM SERPL-SCNC: 140 MMOL/L (ref 136–145)
TSH SERPL DL<=0.005 MIU/L-ACNC: 1.62 UIU/ML (ref 0.36–4.75)
WBC NRBC COR # BLD AUTO: 5.8 10*3/UL (ref 4.8–10.8)

## 2022-10-26 ENCOUNTER — OFFICE VISIT (OUTPATIENT)
Dept: OBGYN | Age: 27
End: 2022-10-26
Payer: COMMERCIAL

## 2022-10-26 VITALS
HEART RATE: 98 BPM | WEIGHT: 168.7 LBS | BODY MASS INDEX: 25.65 KG/M2 | SYSTOLIC BLOOD PRESSURE: 127 MMHG | DIASTOLIC BLOOD PRESSURE: 83 MMHG

## 2022-10-26 DIAGNOSIS — N80.9 ENDOMETRIOSIS DETERMINED BY LAPAROSCOPY: ICD-10-CM

## 2022-10-26 DIAGNOSIS — Z30.431 IUD CHECK UP: Primary | ICD-10-CM

## 2022-10-26 PROCEDURE — G8484 FLU IMMUNIZE NO ADMIN: HCPCS | Performed by: ADVANCED PRACTICE MIDWIFE

## 2022-10-26 PROCEDURE — 99213 OFFICE O/P EST LOW 20 MIN: CPT | Performed by: ADVANCED PRACTICE MIDWIFE

## 2022-10-26 PROCEDURE — G8419 CALC BMI OUT NRM PARAM NOF/U: HCPCS | Performed by: ADVANCED PRACTICE MIDWIFE

## 2022-10-26 PROCEDURE — 99203 OFFICE O/P NEW LOW 30 MIN: CPT | Performed by: ADVANCED PRACTICE MIDWIFE

## 2022-10-26 PROCEDURE — 1036F TOBACCO NON-USER: CPT | Performed by: ADVANCED PRACTICE MIDWIFE

## 2022-10-26 PROCEDURE — G8427 DOCREV CUR MEDS BY ELIG CLIN: HCPCS | Performed by: ADVANCED PRACTICE MIDWIFE

## 2022-10-26 NOTE — PROGRESS NOTES
Kye Malik is a 33 y.o. female who presents here today for checkup for IUD. Mirena was placed by Dr. Kanu Bird under anesthesia following laparoscopic surgery for endometriosis. She reports that they removed endometriosis and placed Mirena then. HPI: This is a follow up for string check and medication check. Patient states that she hasn't had a period since placement, has had a few instances of very light spotting that resolved quickly and spontaneously. States she is very happy with the IUD so far. /83 (Position: Sitting)   Pulse 98   Wt 168 lb 11.2 oz (76.5 kg)   BMI 25.65 kg/m²   No LMP recorded. Allergies: Patient has no known allergies. Past Medical History:   Diagnosis Date    Anesthesia complication     pt states \"I have woke up during procedures\"    Anorexia     Anxiety     Chronic pelvic pain in female     Depression     Migraine     with aura     Past Surgical History:   Procedure Laterality Date    COLONOSCOPY      ESOPHAGOGASTRODUODENOSCOPY      LAPAROSCOPY N/A 2022    DIAGNOSTIC LAPAROSCOPY, FULGURATION OF ENDOMETRIOSIS, LYSIS OF ADHESIONS, PLACEMENT OF MIRENA performed by Amy Franco DO at [facility]       OB History          0    Para   0    Term   0       0    AB   0    Living   0         SAB   0    IAB   0    Ectopic   0    Molar   0    Multiple   0    Live Births   0              No family history on file. Social History     Socioeconomic History    Marital status: Single     Spouse name: Not on file    Number of children: Not on file    Years of education: Not on file    Highest education level: Not on file   Occupational History    Not on file   Tobacco Use    Smoking status: Never    Smokeless tobacco: Never   Vaping Use    Vaping Use: Never used   Substance and Sexual Activity    Alcohol use:  Yes     Alcohol/week: 4.0 standard drinks     Types: 4 Glasses of wine per week    Drug use: Never    Sexual activity: Not Currently   Other Topics Concern    Not on file   Social History Narrative    Not on file     Social Determinants of Health     Financial Resource Strain: Not on file   Food Insecurity: Not on file   Transportation Needs: Not on file   Physical Activity: Not on file   Stress: Not on file   Social Connections: Not on file   Intimate Partner Violence: Not on file   Housing Stability: Not on file       Patient's medications, allergies, past medical, surgical, social and family histories were reviewed and updated as appropriate. REVIEW OF SYSTEMS:    Constitutional: negative  Eyes: negative  Ears, nose, mouth, throat, and face: negative  Respiratory: negative  Cardiovascular: negative  Gastrointestinal: negative  Genitourinary:negative  Integument/breast: negative  Hematologic/lymphatic: negative  Musculoskeletal:negative  Neurological: negative  Allergic/Immunologic: negative        PHYSICAL EXAM  /83 (Position: Sitting)   Pulse 98   Wt 168 lb 11.2 oz (76.5 kg)   BMI 25.65 kg/m²     No LMP recorded. General appearance: alert, cooperative and in no acute distress. Head: NCAT   Eyes: PERRLA  Chest: no skin abnormalities, no masses  Back: No CVAT, no skin abnormalities  Neurologic: Alert and oriented x 3. Grossly normal  Extremities: symmetrical without clubbing cyanosis or edema. Pelvic Exam:   EFG: normal external genitalia, no external lesions  URETHRAL MEATUS: normal size, no diverticula   URETHRA: normal appearing without diverticula or lesions  BLADDER:  No masses or tenderness  VAGINA: normal rugae, no discharge   CERVIX: normal appearing, no lesions, IUD strings visible, approximately 1.5cm. UTERUS: uterus is normal size, shape, consistency and nontender   ADNEXA: normal adnexa in size, nontender and no masses. PERINEUM: normal appearing without lesions or masses  RECTUM: no hemorrhoids or rectal masses. Assessment:     Diagnosis Orders   1. IUD check up        2.  Endometriosis

## 2022-10-26 NOTE — PROGRESS NOTES
Here today for f/u from IUD insertion. States doing well. Came for string check. Discharge instructions have been discussed with the patient. Patient advised to call our office with any questions or concerns. Voiced understanding.

## 2022-12-16 ENCOUNTER — HOSPITAL ENCOUNTER (OUTPATIENT)
Dept: HOSPITAL 83 - SDC | Age: 27
Discharge: HOME | End: 2022-12-16
Attending: SURGERY
Payer: COMMERCIAL

## 2022-12-16 VITALS — SYSTOLIC BLOOD PRESSURE: 139 MMHG | DIASTOLIC BLOOD PRESSURE: 67 MMHG

## 2022-12-16 VITALS — BODY MASS INDEX: 22.73 KG/M2 | WEIGHT: 150 LBS | HEIGHT: 67.99 IN

## 2022-12-16 VITALS — SYSTOLIC BLOOD PRESSURE: 96 MMHG | DIASTOLIC BLOOD PRESSURE: 49 MMHG

## 2022-12-16 VITALS — DIASTOLIC BLOOD PRESSURE: 58 MMHG

## 2022-12-16 VITALS — DIASTOLIC BLOOD PRESSURE: 50 MMHG

## 2022-12-16 DIAGNOSIS — F41.9: ICD-10-CM

## 2022-12-16 DIAGNOSIS — R19.4: Primary | ICD-10-CM

## 2022-12-16 DIAGNOSIS — F32.A: ICD-10-CM

## 2022-12-16 DIAGNOSIS — Z79.899: ICD-10-CM

## 2023-03-08 ENCOUNTER — HOSPITAL ENCOUNTER (OUTPATIENT)
Dept: HOSPITAL 83 - RAD | Age: 28
Discharge: HOME | End: 2023-03-08
Attending: STUDENT IN AN ORGANIZED HEALTH CARE EDUCATION/TRAINING PROGRAM
Payer: COMMERCIAL
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
## 2023-03-08 DIAGNOSIS — S99.822A: Primary | ICD-10-CM
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
## 2023-03-08 DIAGNOSIS — M79.672: ICD-10-CM
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
## 2024-01-22 ENCOUNTER — HOSPITAL ENCOUNTER (OUTPATIENT)
Dept: HOSPITAL 83 - LAB | Age: 29
End: 2024-01-22
Attending: INTERNAL MEDICINE
Payer: COMMERCIAL

## 2024-01-22 DIAGNOSIS — R53.83: Primary | ICD-10-CM

## 2024-01-22 LAB
25(OH)D3 SERPL-MCNC: 30.3 NG/ML (ref 30–100)
ALP SERPL-CCNC: 61 U/L (ref 46–116)
ALT SERPL W P-5'-P-CCNC: 7 U/L (ref 5–49)
BASOPHILS # BLD AUTO: 0 10*3/UL (ref 0–0.1)
BASOPHILS NFR BLD AUTO: 0.5 % (ref 0–1)
BUN SERPL-MCNC: 12 MG/DL (ref 9–23)
CHLORIDE SERPL-SCNC: 105 MMOL/L (ref 98–107)
EOSINOPHIL # BLD AUTO: 0.1 10*3/UL (ref 0–0.4)
EOSINOPHIL # BLD AUTO: 1.3 % (ref 1–4)
ERYTHROCYTE [DISTWIDTH] IN BLOOD BY AUTOMATED COUNT: 12.3 % (ref 0–14.5)
HCT VFR BLD AUTO: 39.6 % (ref 37–47)
LYMPHOCYTES # BLD AUTO: 2.1 10*3/UL (ref 1.3–4.4)
LYMPHOCYTES NFR BLD AUTO: 26 % (ref 27–41)
MCH RBC QN AUTO: 30 PG (ref 27–31)
MCHC RBC AUTO-ENTMCNC: 32.3 G/DL (ref 33–37)
MCV RBC AUTO: 93 FL (ref 81–99)
MONOCYTES # BLD AUTO: 0.6 10*3/UL (ref 0.1–1)
MONOCYTES NFR BLD MANUAL: 7 % (ref 3–9)
NEUT #: 5.3 10*3/UL (ref 2.3–7.9)
NEUT %: 65 % (ref 47–73)
NRBC BLD QL AUTO: 0 10*3/UL (ref 0–0)
PLATELET # BLD AUTO: 282 10*3/UL (ref 130–400)
PMV BLD AUTO: 9.9 FL (ref 9.6–12.3)
POTASSIUM SERPL-SCNC: 3.7 MMOL/L (ref 3.4–5.1)
PROT SERPL-MCNC: 7.2 GM/DL (ref 6–8)
RBC # BLD AUTO: 4.26 10*6/UL (ref 4.1–5.1)
WBC NRBC COR # BLD AUTO: 8.2 10*3/UL (ref 4.8–10.8)

## 2024-12-03 ENCOUNTER — HOSPITAL ENCOUNTER (OUTPATIENT)
Dept: HOSPITAL 83 - LAB | Age: 29
Discharge: HOME | End: 2024-12-03
Attending: INTERNAL MEDICINE
Payer: COMMERCIAL

## 2024-12-03 DIAGNOSIS — R50.9: Primary | ICD-10-CM

## 2024-12-03 LAB
ALP SERPL-CCNC: 76 U/L (ref 46–116)
ALT SERPL W P-5'-P-CCNC: 13 U/L (ref 5–49)
BACTERIA #/AREA URNS HPF: (no result) /[HPF]
BASOPHILS # BLD AUTO: 0 10*3/UL (ref 0–0.1)
BASOPHILS NFR BLD AUTO: 0.3 % (ref 0–1)
BUN SERPL-MCNC: 10 MG/DL (ref 9–23)
CHLORIDE SERPL-SCNC: 102 MMOL/L (ref 98–107)
EOSINOPHIL # BLD AUTO: 0 10*3/UL (ref 0–0.4)
EOSINOPHIL # BLD AUTO: 0.6 % (ref 1–4)
EPI CELLS #/AREA URNS HPF: (no result) /[HPF]
ERYTHROCYTE [DISTWIDTH] IN BLOOD BY AUTOMATED COUNT: 12.6 % (ref 0–14.5)
HCT VFR BLD AUTO: 38.9 % (ref 37–47)
HGB UR QL STRIP: (no result)
MCH RBC QN AUTO: 30.2 PG (ref 27–31)
MCHC RBC AUTO-ENTMCNC: 32.4 G/DL (ref 33–37)
MCV RBC AUTO: 93.3 FL (ref 81–99)
MONOCYTES # BLD AUTO: 0.7 10*3/UL (ref 0.1–1)
MONOCYTES NFR BLD MANUAL: 9.8 % (ref 3–9)
NEUT #: 5.2 10*3/UL (ref 2.3–7.9)
NEUT %: 77.4 % (ref 47–73)
NRBC BLD QL AUTO: 0 10*3/UL (ref 0–0)
PH UR STRIP: 6 [PH] (ref 4.5–8)
PLATELET # BLD AUTO: 266 10*3/UL (ref 130–400)
PMV BLD AUTO: 9.8 FL (ref 9.6–12.3)
POTASSIUM SERPL-SCNC: 4.1 MMOL/L (ref 3.4–5.1)
PROT SERPL-MCNC: 7.4 GM/DL (ref 6–8)
RBC # BLD AUTO: 4.17 10*6/UL (ref 4.1–5.1)
RBC #/AREA URNS HPF: (no result) RBC/HPF (ref 0–2)
SP GR UR: 1.01 (ref 1–1.03)
UROBILINOGEN UR STRIP-MCNC: 0.2 E.U./DL (ref 0–1)
WBC #/AREA URNS HPF: (no result) WBC/HPF (ref 0–5)
WBC NRBC COR # BLD AUTO: 6.7 10*3/UL (ref 4.8–10.8)

## 2024-12-05 ENCOUNTER — HOSPITAL ENCOUNTER (EMERGENCY)
Dept: HOSPITAL 83 - ED | Age: 29
Discharge: HOME | End: 2024-12-05
Payer: COMMERCIAL

## 2024-12-05 VITALS — HEIGHT: 67.99 IN | BODY MASS INDEX: 26.52 KG/M2 | WEIGHT: 175 LBS

## 2024-12-05 DIAGNOSIS — I10: ICD-10-CM

## 2024-12-05 DIAGNOSIS — K59.00: Primary | ICD-10-CM

## 2024-12-05 DIAGNOSIS — Z98.890: ICD-10-CM

## 2024-12-05 DIAGNOSIS — R11.2: ICD-10-CM

## 2024-12-05 DIAGNOSIS — E78.5: ICD-10-CM

## 2024-12-05 DIAGNOSIS — F32.A: ICD-10-CM

## 2024-12-05 DIAGNOSIS — F41.9: ICD-10-CM

## 2024-12-05 DIAGNOSIS — Z90.49: ICD-10-CM

## 2024-12-05 LAB
ALP SERPL-CCNC: 79 U/L (ref 46–116)
ALT SERPL W P-5'-P-CCNC: 15 U/L (ref 5–49)
BASOPHILS # BLD AUTO: 0 10*3/UL (ref 0–0.1)
BASOPHILS NFR BLD AUTO: 0.2 % (ref 0–1)
BUN SERPL-MCNC: 12 MG/DL (ref 9–23)
CHLORIDE SERPL-SCNC: 102 MMOL/L (ref 98–107)
EOSINOPHIL # BLD AUTO: 0 10*3/UL (ref 0–0.4)
EOSINOPHIL # BLD AUTO: 0.6 % (ref 1–4)
ERYTHROCYTE [DISTWIDTH] IN BLOOD BY AUTOMATED COUNT: 12.4 % (ref 0–14.5)
HCT VFR BLD AUTO: 41.3 % (ref 37–47)
LIPASE SERPL-CCNC: 29 U/L (ref 12–53)
MCH RBC QN AUTO: 29.8 PG (ref 27–31)
MCHC RBC AUTO-ENTMCNC: 32.4 G/DL (ref 33–37)
MCV RBC AUTO: 92 FL (ref 81–99)
MONOCYTES # BLD AUTO: 0.6 10*3/UL (ref 0.1–1)
MONOCYTES NFR BLD MANUAL: 11.2 % (ref 3–9)
NEUT #: 3.1 10*3/UL (ref 2.3–7.9)
NEUT %: 61.2 % (ref 47–73)
NRBC BLD QL AUTO: 0 % (ref 0–0)
PLATELET # BLD AUTO: 273 10*3/UL (ref 130–400)
PMV BLD AUTO: 9.8 FL (ref 9.6–12.3)
POTASSIUM SERPL-SCNC: 3.6 MMOL/L (ref 3.4–5.1)
PROT SERPL-MCNC: 7.7 GM/DL (ref 6–8)
RBC # BLD AUTO: 4.49 10*6/UL (ref 4.1–5.1)
WBC NRBC COR # BLD AUTO: 5.1 10*3/UL (ref 4.8–10.8)

## 2025-07-07 ENCOUNTER — HOSPITAL ENCOUNTER (OUTPATIENT)
Dept: HOSPITAL 83 - RAD | Age: 30
Discharge: HOME | End: 2025-07-07
Attending: INTERNAL MEDICINE
Payer: COMMERCIAL

## 2025-07-07 DIAGNOSIS — M25.572: Primary | ICD-10-CM

## 2025-07-18 ENCOUNTER — HOSPITAL ENCOUNTER (OUTPATIENT)
Dept: HOSPITAL 83 - LAB | Age: 30
Discharge: HOME | End: 2025-07-18
Attending: FAMILY MEDICINE
Payer: COMMERCIAL

## 2025-07-18 DIAGNOSIS — R30.0: Primary | ICD-10-CM

## 2025-07-18 LAB
APPEARANCE UR: (no result)
BACTERIA #/AREA URNS HPF: (no result) /[HPF]
BILIRUB UR QL STRIP: NEGATIVE
CASTS URNS QL MICRO: (no result)
COLOR UR: (no result)
CRYSTALS URNS MICRO: (no result)
EPI CELLS #/AREA URNS HPF: (no result) /[HPF]
GLUCOSE UR QL: NEGATIVE
HGB UR QL STRIP: (no result)
KETONES UR QL STRIP: (no result)
LEUKOCYTE ESTERASE UR QL STRIP: (no result)
MUCOUS THREADS URNS QL MICRO: (no result)
NITRITE UR QL STRIP: NEGATIVE
PH UR STRIP: 5.5 [PH] (ref 4.5–8)
RBC #/AREA URNS HPF: (no result) RBC/HPF (ref 0–2)
SP GR UR: 1.02 (ref 1–1.03)
T VAGINALIS URNS QL MICRO: (no result)
UROBILINOGEN UR STRIP-MCNC: 0.2 E.U./DL (ref 0–1)
WBC #/AREA URNS HPF: (no result) WBC/HPF (ref 0–5)
YEAST #/AREA URNS HPF: (no result) /[HPF]

## (undated) DEVICE — SYRINGE MED 10ML POLYPR LUERSLIP TIP FLAT TOP W/O SFTY DISP

## (undated) DEVICE — ADHESIVE SKIN CLSR 0.7ML TOP DERMBND ADV

## (undated) DEVICE — DRAINBAG,ANTI-REFLUX TOWER,L/F,2000ML,LL: Brand: MEDLINE

## (undated) DEVICE — ELECTRODE PT RET AD L9FT HI MOIST COND ADH HYDRGEL CORDED

## (undated) DEVICE — PACK,AURORA,LAVH: Brand: MEDLINE

## (undated) DEVICE — SYRINGE 20ML LL S/C 50

## (undated) DEVICE — VAGINAL PREP TRAY: Brand: MEDLINE INDUSTRIES, INC.

## (undated) DEVICE — ZAMI/KRONNER 4.5, UTERINE MANIPULATOR/INJECTOR, 12/BX: Brand: MARINA MEDICAL

## (undated) DEVICE — SOLUTION IV IRRIG POUR BRL 0.9% SODIUM CHL 2F7124

## (undated) DEVICE — INSUFFLATION TUBING SET WITH FILTER, FUNNEL CONNECTOR AND LUER LOCK: Brand: JOSNOE MEDICAL INC

## (undated) DEVICE — CATHETER,FOLEY,SILI-ELAST,LTX,16FR,10ML: Brand: MEDLINE

## (undated) DEVICE — PAD,SANITARY,11 IN,MAXI,N-STRL,IND WRAP: Brand: MEDLINE

## (undated) DEVICE — TROCAR: Brand: KII FIOS FIRST ENTRY

## (undated) DEVICE — LAPAROSCOPIC SCISSORS: Brand: EPIX LAPAROSCOPIC SCISSORS

## (undated) DEVICE — INTENDED FOR TISSUE SEPARATION, AND OTHER PROCEDURES THAT REQUIRE A SHARP SURGICAL BLADE TO PUNCTURE OR CUT.: Brand: BARD-PARKER ® STAINLESS STEEL BLADES

## (undated) DEVICE — ELECTRODE ENDOSCP L36CM PTFE SPAT CRV DISP CLEANCOAT

## (undated) DEVICE — TRAY 0 DEG STRYKER 5MM/10MM LENS REUSABLE

## (undated) DEVICE — Device

## (undated) DEVICE — TROCAR: Brand: KII® SLEEVE

## (undated) DEVICE — COVER,LIGHT HANDLE,FLX,2/PK: Brand: MEDLINE INDUSTRIES, INC.

## (undated) DEVICE — TOWEL,OR,DSP,ST,BLUE,STD,6/PK,12PK/CS: Brand: MEDLINE

## (undated) DEVICE — CHLORAPREP 26ML ORANGE

## (undated) DEVICE — GLOVE SURG SZ 6 THK91MIL LTX FREE SYN POLYISOPRENE ANTI

## (undated) DEVICE — CAMERA STRYKER 1488

## (undated) DEVICE — GOWN,SIRUS,FABRNF,XL,20/CS: Brand: MEDLINE

## (undated) DEVICE — DOUBLE BASIN SET: Brand: MEDLINE INDUSTRIES, INC.

## (undated) DEVICE — GYN LAPROSCOPIC FORCEPS

## (undated) DEVICE — GOWN,SIRUS,FABRNF,L,20/CS: Brand: MEDLINE

## (undated) DEVICE — 4-PORT MANIFOLD: Brand: NEPTUNE 2

## (undated) DEVICE — PACK PROCEDURE SURG GEN CUST

## (undated) DEVICE — KIT,ANTI FOG,W/SPONGE & FLUID,SOFT PACK: Brand: MEDLINE

## (undated) DEVICE — TRAY SET D